# Patient Record
Sex: MALE | Race: BLACK OR AFRICAN AMERICAN | NOT HISPANIC OR LATINO | URBAN - METROPOLITAN AREA
[De-identification: names, ages, dates, MRNs, and addresses within clinical notes are randomized per-mention and may not be internally consistent; named-entity substitution may affect disease eponyms.]

---

## 2017-09-24 ENCOUNTER — EMERGENCY (EMERGENCY)
Facility: HOSPITAL | Age: 47
LOS: 1 days | Discharge: HOME | End: 2017-09-24
Attending: EMERGENCY MEDICINE | Admitting: EMERGENCY MEDICINE
Payer: COMMERCIAL

## 2017-09-24 VITALS
DIASTOLIC BLOOD PRESSURE: 91 MMHG | HEIGHT: 66 IN | TEMPERATURE: 99 F | WEIGHT: 179.9 LBS | OXYGEN SATURATION: 96 % | SYSTOLIC BLOOD PRESSURE: 137 MMHG | RESPIRATION RATE: 18 BRPM | HEART RATE: 88 BPM

## 2017-09-24 VITALS
TEMPERATURE: 98 F | HEART RATE: 82 BPM | OXYGEN SATURATION: 97 % | DIASTOLIC BLOOD PRESSURE: 86 MMHG | SYSTOLIC BLOOD PRESSURE: 136 MMHG | RESPIRATION RATE: 17 BRPM

## 2017-09-24 DIAGNOSIS — M54.5 LOW BACK PAIN: ICD-10-CM
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
## 2017-09-24 PROCEDURE — 99283 EMERGENCY DEPT VISIT LOW MDM: CPT | Mod: 25

## 2017-09-24 PROCEDURE — 72100 X-RAY EXAM L-S SPINE 2/3 VWS: CPT

## 2017-09-24 PROCEDURE — 99284 EMERGENCY DEPT VISIT MOD MDM: CPT

## 2017-09-24 PROCEDURE — 96372 THER/PROPH/DIAG INJ SC/IM: CPT

## 2017-09-24 PROCEDURE — 72100 X-RAY EXAM L-S SPINE 2/3 VWS: CPT | Mod: 26

## 2017-09-24 RX ORDER — LIDOCAINE 4 G/100G
1 CREAM TOPICAL
Qty: 30 | Refills: 0 | OUTPATIENT
Start: 2017-09-24 | End: 2017-10-24

## 2017-09-24 RX ORDER — CYCLOBENZAPRINE HYDROCHLORIDE 10 MG/1
1 TABLET, FILM COATED ORAL
Qty: 9 | Refills: 0 | OUTPATIENT
Start: 2017-09-24 | End: 2017-09-27

## 2017-09-24 RX ORDER — IBUPROFEN 200 MG
1 TABLET ORAL
Qty: 20 | Refills: 0 | OUTPATIENT
Start: 2017-09-24 | End: 2017-09-29

## 2017-09-24 RX ORDER — KETOROLAC TROMETHAMINE 30 MG/ML
60 SYRINGE (ML) INJECTION ONCE
Qty: 0 | Refills: 0 | Status: DISCONTINUED | OUTPATIENT
Start: 2017-09-24 | End: 2017-09-24

## 2017-09-24 RX ORDER — CYCLOBENZAPRINE HYDROCHLORIDE 10 MG/1
10 TABLET, FILM COATED ORAL ONCE
Qty: 0 | Refills: 0 | Status: COMPLETED | OUTPATIENT
Start: 2017-09-24 | End: 2017-09-24

## 2017-09-24 RX ORDER — LIDOCAINE 4 G/100G
1 CREAM TOPICAL ONCE
Qty: 0 | Refills: 0 | Status: COMPLETED | OUTPATIENT
Start: 2017-09-24 | End: 2017-09-24

## 2017-09-24 RX ADMIN — LIDOCAINE 1 PATCH: 4 CREAM TOPICAL at 15:02

## 2017-09-24 RX ADMIN — CYCLOBENZAPRINE HYDROCHLORIDE 10 MILLIGRAM(S): 10 TABLET, FILM COATED ORAL at 15:02

## 2017-09-24 RX ADMIN — Medication 60 MILLIGRAM(S): at 15:02

## 2017-09-24 RX ADMIN — Medication 60 MILLIGRAM(S): at 15:47

## 2017-09-24 RX ADMIN — Medication 60 MILLIGRAM(S): at 15:25

## 2017-09-24 NOTE — ED PROVIDER NOTE - CHPI ED SYMPTOMS NEG
no neck tenderness/no difficulty bearing weight/no motor function loss/no bowel dysfunction/no constipation/no bladder dysfunction/no numbness

## 2017-09-24 NOTE — ED PROVIDER NOTE - ATTENDING CONTRIBUTION TO CARE
Pt is a 48 yo male who presents to the ED with a cc of left lower back pain.  No significant PMHx.  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  He reports that on Friday morning he awoke in bed on his back.  He went to roll onto his left side and felt a sudden onset of sharp pain.  He states that he has been in pain since and that any small movement increases the pain significantly.  Denies prior injury to his back but reports similar pain several years ago and at that time he was diagnosed with a muscle spasm.  Denies fever, chills, N/V, CP, SOB, abd pain, ext numbness or weakness.  Denies loss of bowel or bladder function.  Denies hematuria, dysuria, frequency, or urgency.  Pt is has not taken anything for the pain.  On exam pt lying on his right side in mild distress due to pain.  NCAT, PERRL, EOMI, heart RRR, lungs CTA, abd soft NT/ND.  No midline C/T/L tenderness, no step offs or deformities.  TTP left paraspinal muscle region lower thoracic diffuse lumbar with increased tone and spasm.  No rash noted.  Sensation intact to bilateral lower ext with +pedal pulses bilaterally.  Agree with above plan of care

## 2017-09-24 NOTE — ED PROVIDER NOTE - OBJECTIVE STATEMENT
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  no abdominal pain.  has been taking otc advil for pain, not helping much.  Cant remember name of PMD , No ortho

## 2017-09-24 NOTE — ED ADULT TRIAGE NOTE - CHIEF COMPLAINT QUOTE
"my lower back on my left side has been hurting since friday."  patient states "it hurts more when I turn."   patient denies fever, chills, abdominal pain, n/v, urinary symptoms.

## 2017-11-16 ENCOUNTER — EMERGENCY (EMERGENCY)
Facility: HOSPITAL | Age: 47
LOS: 1 days | Discharge: HOME | End: 2017-11-16
Attending: EMERGENCY MEDICINE | Admitting: EMERGENCY MEDICINE
Payer: COMMERCIAL

## 2017-11-16 VITALS
OXYGEN SATURATION: 97 % | WEIGHT: 179.9 LBS | DIASTOLIC BLOOD PRESSURE: 84 MMHG | RESPIRATION RATE: 16 BRPM | HEART RATE: 71 BPM | SYSTOLIC BLOOD PRESSURE: 121 MMHG | TEMPERATURE: 98 F
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
## 2017-11-16 DIAGNOSIS — S05.02XA INJURY OF CONJUNCTIVA AND CORNEAL ABRASION WITHOUT FOREIGN BODY, LEFT EYE, INITIAL ENCOUNTER: ICD-10-CM

## 2017-11-16 DIAGNOSIS — H57.12 OCULAR PAIN, LEFT EYE: ICD-10-CM

## 2017-11-16 PROCEDURE — 99282 EMERGENCY DEPT VISIT SF MDM: CPT

## 2017-11-16 PROCEDURE — 99284 EMERGENCY DEPT VISIT MOD MDM: CPT

## 2017-11-16 RX ORDER — TOBRAMYCIN AND DEXAMETHASONE 1; 3 MG/ML; MG/ML
2 SUSPENSION/ DROPS OPHTHALMIC
Qty: 1 | Refills: 0
Start: 2017-11-16 | End: 2017-11-21

## 2017-11-16 NOTE — ED PROVIDER NOTE - OBJECTIVE STATEMENT
48 yo black male with 1-day of slight burning pain and redness to left eye without any identifiable trauma. No fever or chills. No diplopia. No headache.  Does not wear contact lenses

## 2017-11-16 NOTE — ED ADULT NURSE NOTE - OBJECTIVE STATEMENT
received pt in FT pt states he feels like something in his left eye since yesterday received pt in FT pt states he feels like something in his left eye since yesterday Pt seen & d/c'd by Dr Hartley

## 2017-11-16 NOTE — ED PROVIDER NOTE - EYES, MLM
Left eye with injected sclera. Lid everted with no FB. Woods lamp after Fluorescein revealed vertical abrasion to left of cornea

## 2018-06-05 ENCOUNTER — EMERGENCY (EMERGENCY)
Facility: HOSPITAL | Age: 48
LOS: 1 days | Discharge: HOME | End: 2018-06-05
Attending: EMERGENCY MEDICINE
Payer: COMMERCIAL

## 2018-06-05 VITALS
TEMPERATURE: 98 F | HEART RATE: 76 BPM | SYSTOLIC BLOOD PRESSURE: 134 MMHG | RESPIRATION RATE: 16 BRPM | DIASTOLIC BLOOD PRESSURE: 90 MMHG | OXYGEN SATURATION: 98 % | HEIGHT: 66 IN | WEIGHT: 179.9 LBS

## 2018-06-05 DIAGNOSIS — Z98.890 OTHER SPECIFIED POSTPROCEDURAL STATES: Chronic | ICD-10-CM

## 2018-06-05 PROCEDURE — 99284 EMERGENCY DEPT VISIT MOD MDM: CPT

## 2018-06-05 PROCEDURE — 99283 EMERGENCY DEPT VISIT LOW MDM: CPT

## 2018-06-05 RX ORDER — TOBRAMYCIN 0.3 %
1 DROPS OPHTHALMIC (EYE)
Qty: 1 | Refills: 0
Start: 2018-06-05 | End: 2018-06-11

## 2018-06-05 NOTE — ED PROVIDER NOTE - OBJECTIVE STATEMENT
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 utd,  no opthalmologisty, PMD cannot remember name.

## 2018-06-05 NOTE — ED PROVIDER NOTE - PROGRESS NOTE DETAILS
left eye + corneal abrasion, rx tobramycin sent to pharmacy,  advised follow up with opthalmologist, given information for Dr Velasco, recommended over the counter tylenol or motrin as directed for pain, any concerns, condition worsens return to ed

## 2018-06-05 NOTE — ED ADULT NURSE NOTE - OBJECTIVE STATEMENT
pt was at work ripping down a wall, looked up and got pain in his left eye. pt rinsed out eye but pain still continued.

## 2018-06-05 NOTE — ED PROVIDER NOTE - CHPI ED SYMPTOMS NEG
no itching/no eye lid swelling/no discharge/no blurred vision/no double vision/no drainage/no photophobia/no purulent drainage

## 2019-01-11 NOTE — ED PROVIDER NOTE - NEURO NEGATIVE STATEMENT, MLM
no loss of consciousness, no gait abnormality, no headache, no sensory deficits, and no weakness. 21.6

## 2019-10-27 ENCOUNTER — EMERGENCY (EMERGENCY)
Facility: HOSPITAL | Age: 49
LOS: 1 days | Discharge: HOME | End: 2019-10-27
Attending: EMERGENCY MEDICINE | Admitting: EMERGENCY MEDICINE
Payer: COMMERCIAL

## 2019-10-27 VITALS
HEART RATE: 80 BPM | WEIGHT: 179.9 LBS | RESPIRATION RATE: 15 BRPM | DIASTOLIC BLOOD PRESSURE: 95 MMHG | OXYGEN SATURATION: 98 % | TEMPERATURE: 98 F | SYSTOLIC BLOOD PRESSURE: 146 MMHG | HEIGHT: 66 IN

## 2019-10-27 DIAGNOSIS — Z98.890 OTHER SPECIFIED POSTPROCEDURAL STATES: Chronic | ICD-10-CM

## 2019-10-27 PROCEDURE — 99283 EMERGENCY DEPT VISIT LOW MDM: CPT

## 2019-10-27 PROCEDURE — 99283 EMERGENCY DEPT VISIT LOW MDM: CPT | Mod: 25

## 2019-10-27 PROCEDURE — 72040 X-RAY EXAM NECK SPINE 2-3 VW: CPT

## 2019-10-27 PROCEDURE — 72040 X-RAY EXAM NECK SPINE 2-3 VW: CPT | Mod: 26

## 2019-10-27 RX ORDER — LIDOCAINE 4 G/100G
1 CREAM TOPICAL ONCE
Refills: 0 | Status: COMPLETED | OUTPATIENT
Start: 2019-10-27 | End: 2019-10-27

## 2019-10-27 RX ADMIN — LIDOCAINE 1 PATCH: 4 CREAM TOPICAL at 22:06

## 2019-10-27 RX ADMIN — Medication 500 MILLIGRAM(S): at 22:06

## 2019-10-27 NOTE — ED PROVIDER NOTE - OBJECTIVE STATEMENT
49 y male presents with left neck pain x 5 days, states he "slept wrong", then pain began 49 y male presents with left neck pain x 5 days, states he "slept wrong", then pain began  , denies fever, blunt trauma,  states he went to urgent care yesterday, was started on robaxin, states it is not helping the pain much, is taking otc tylenol for pain.  denies hx of problems with his neck.  otherwise feels well.  No ortho, No pmd

## 2019-10-27 NOTE — ED PROVIDER NOTE - PATIENT PORTAL LINK FT
You can access the FollowMyHealth Patient Portal offered by [facility] by registering at the following website: [email or web address]. By joining [facility]’s FollowMyHealth portal, you will also be able to view your health information using other applications (apps) compatible with our system.

## 2019-10-27 NOTE — ED PROVIDER NOTE - ATTENDING CONTRIBUTION TO CARE
Pt seen and examined and d/w PA.  agree with a and p.  pt is a 48 yo male no sign hx. pt iwht 5 days of left neck/trap pain after sleeping funny.  pt denies radiation, numbness or weakness in arms.  on exam, left trapezius spasm. pt went to  and given robaxin but no relief so came here.  on rest of exam, c spine nt, sm intact upper extremities, 2+ pulses.  xray neg unremarkable, add lidocaine patch, nsiads, heat, fu ortho

## 2019-10-27 NOTE — ED ADULT NURSE NOTE - CHIEF COMPLAINT QUOTE
neck pain and stiffness x 4 days. patient reports worsening pain today. was seen at urgent care yesterday and prescribed robaxin with no relief

## 2019-10-27 NOTE — ED ADULT NURSE NOTE - OBJECTIVE STATEMENT
Present to ER with c/o of neck pain and stiffness x 4 days. patient reports worsening pain today. was seen at urgent care yesterday and prescribed robaxin with no relief. Denies any chest pain or shortness of breath.

## 2019-10-27 NOTE — ED PROVIDER NOTE - PROGRESS NOTE DETAILS
xray spasm, no acute bony irregularity, rx naproxen sent to pharmacy,  given information for ortho Dr Simons, and a pmd Dr Capellan,  call tomorrow to arrange follow up, otc lidocaine patch as directed for pain

## 2019-10-28 ENCOUNTER — APPOINTMENT (OUTPATIENT)
Dept: INTERNAL MEDICINE | Facility: CLINIC | Age: 49
End: 2019-10-28
Payer: COMMERCIAL

## 2019-10-28 VITALS
DIASTOLIC BLOOD PRESSURE: 74 MMHG | WEIGHT: 176 LBS | TEMPERATURE: 98.3 F | HEART RATE: 72 BPM | SYSTOLIC BLOOD PRESSURE: 116 MMHG | OXYGEN SATURATION: 97 % | RESPIRATION RATE: 14 BRPM | HEIGHT: 69 IN | BODY MASS INDEX: 26.07 KG/M2

## 2019-10-28 DIAGNOSIS — M62.838 OTHER MUSCLE SPASM: ICD-10-CM

## 2019-10-28 PROBLEM — Z00.00 ENCOUNTER FOR PREVENTIVE HEALTH EXAMINATION: Status: ACTIVE | Noted: 2019-10-28

## 2019-10-28 PROCEDURE — 99203 OFFICE O/P NEW LOW 30 MIN: CPT

## 2019-10-28 RX ORDER — METHYLPREDNISOLONE 4 MG/1
4 TABLET ORAL
Qty: 1 | Refills: 0 | Status: ACTIVE | COMMUNITY
Start: 2019-10-28 | End: 1900-01-01

## 2019-10-28 NOTE — PLAN
[FreeTextEntry1] : Discontinue naproxen\par Start medrol dosepack\par continue muscle relaxer\par RTO 1 week

## 2019-10-28 NOTE — HISTORY OF PRESENT ILLNESS
[FreeTextEntry8] : Pt c.o neck discomfort and tight deltoid muscles for 3 days. Pt went to an Urgent Care 2 days ago and received a muscle relaxer. He went to the ER yesterday and received a prescription for Naproxem . He isn't feeling any better. P t reports having negative x-rays in the ER

## 2019-10-28 NOTE — PHYSICAL EXAM
[No Acute Distress] : no acute distress [Well Nourished] : well nourished [Well Developed] : well developed [Well-Appearing] : well-appearing [Normal Sclera/Conjunctiva] : normal sclera/conjunctiva [PERRL] : pupils equal round and reactive to light [EOMI] : extraocular movements intact [Normal Outer Ear/Nose] : the outer ears and nose were normal in appearance [Normal Oropharynx] : the oropharynx was normal [No JVD] : no jugular venous distention [No Lymphadenopathy] : no lymphadenopathy [Supple] : supple [Thyroid Normal, No Nodules] : the thyroid was normal and there were no nodules present [No Respiratory Distress] : no respiratory distress  [No Accessory Muscle Use] : no accessory muscle use [Clear to Auscultation] : lungs were clear to auscultation bilaterally [Normal Rate] : normal rate  [Regular Rhythm] : with a regular rhythm [Normal S1, S2] : normal S1 and S2 [No Murmur] : no murmur heard [No Carotid Bruits] : no carotid bruits [No Abdominal Bruit] : a ~M bruit was not heard ~T in the abdomen [No Varicosities] : no varicosities [Pedal Pulses Present] : the pedal pulses are present [No Edema] : there was no peripheral edema [No Palpable Aorta] : no palpable aorta [No Extremity Clubbing/Cyanosis] : no extremity clubbing/cyanosis [Soft] : abdomen soft [Non Tender] : non-tender [Non-distended] : non-distended [No Masses] : no abdominal mass palpated [No HSM] : no HSM [Normal Bowel Sounds] : normal bowel sounds [Normal Posterior Cervical Nodes] : no posterior cervical lymphadenopathy [Normal Anterior Cervical Nodes] : no anterior cervical lymphadenopathy [No CVA Tenderness] : no CVA  tenderness [No Spinal Tenderness] : no spinal tenderness [No Joint Swelling] : no joint swelling [Grossly Normal Strength/Tone] : grossly normal strength/tone [No Rash] : no rash [Coordination Grossly Intact] : coordination grossly intact [No Focal Deficits] : no focal deficits [Normal Gait] : normal gait [Deep Tendon Reflexes (DTR)] : deep tendon reflexes were 2+ and symmetric [Normal Affect] : the affect was normal [Normal Insight/Judgement] : insight and judgment were intact [de-identified] : Moderately tight deltoid muscles. Decreased range of motion of neck

## 2019-11-04 ENCOUNTER — APPOINTMENT (OUTPATIENT)
Dept: INTERNAL MEDICINE | Facility: CLINIC | Age: 49
End: 2019-11-04

## 2020-01-10 NOTE — ED ADULT TRIAGE NOTE - CHIEF COMPLAINT QUOTE
neck pain and stiffness x 4 days. patient reports worsening pain today. was seen at urgent care yesterday and prescribed robaxin with no relief Alert and oriented, no focal deficits, no motor or sensory deficits.

## 2020-08-05 NOTE — ED ADULT TRIAGE NOTE - RESPIRATORY RATE (BREATHS/MIN)
18 Anesthesia Volume In Cc: 0.5 Post-Care Instructions: I reviewed with the patient in detail post-care instructions. Patient is to wear sunprotection, and avoid picking at any of the treated lesions. Pt may apply Vaseline to crusted or scabbing areas. Detail Level: Detailed Consent: The patient's consent was obtained including but not limited to risks of crusting, scabbing, blistering, scarring, darker or lighter pigmentary change, recurrence, incomplete removal and infection. Price (Use Numbers Only, No Special Characters Or $): 50

## 2021-05-30 NOTE — ED PROVIDER NOTE - NSCAREINITIATED _GEN_ER
Patient is a new consult being seen for PVD. Claudication type symptoms in the right leg. Patient stated can walk about 1/4 mile before pain occurs in the right calf. Does go away with rest. Left leg angioplasty 12yrs ago on left leg done by Dr. Steven Guadarrama. Former smoker on and off for about 39 years. Quit 4 year ago. Patient did have arterial testing done 6/25/19.         Hemanth Roper)

## 2023-09-05 NOTE — ED PROVIDER NOTE - CHIEF COMPLAINT
[facility] [address]    Past Medical History  Past Medical History:   Diagnosis Date    CVA (cerebral vascular accident) ([facility] [address]     Mercedes Bautista M.S. MONTEZ/SLP  Speech-Language Pathologist  [facility]  #[id number] The patient is a 48y Male complaining of eye pain/injury.

## 2024-08-23 PROBLEM — S22.20XA STERNAL FRACTURE: Status: ACTIVE | Noted: 2024-08-23

## 2024-08-26 ENCOUNTER — APPOINTMENT (OUTPATIENT)
Dept: THORACIC SURGERY | Facility: CLINIC | Age: 54
End: 2024-08-26
Payer: COMMERCIAL

## 2024-08-26 VITALS
HEART RATE: 76 BPM | WEIGHT: 210 LBS | HEIGHT: 66 IN | RESPIRATION RATE: 17 BRPM | DIASTOLIC BLOOD PRESSURE: 88 MMHG | OXYGEN SATURATION: 97 % | BODY MASS INDEX: 33.75 KG/M2 | SYSTOLIC BLOOD PRESSURE: 129 MMHG

## 2024-08-26 DIAGNOSIS — S22.20XA UNSPECIFIED FRACTURE OF STERNUM, INITIAL ENCOUNTER FOR CLOSED FRACTURE: ICD-10-CM

## 2024-08-26 PROCEDURE — 99204 OFFICE O/P NEW MOD 45 MIN: CPT

## 2024-08-26 RX ORDER — OXYCODONE 5 MG/1
5 TABLET ORAL EVERY 6 HOURS
Qty: 20 | Refills: 0 | Status: ACTIVE | COMMUNITY
Start: 2024-08-26 | End: 1900-01-01

## 2024-08-26 RX ORDER — IBUPROFEN 600 MG/1
600 TABLET, FILM COATED ORAL EVERY 6 HOURS
Qty: 28 | Refills: 0 | Status: ACTIVE | COMMUNITY
Start: 2024-08-26 | End: 1900-01-01

## 2024-08-26 NOTE — ASSESSMENT
[FreeTextEntry1] : Mr. EMELY NESS, 54 year old male, never smoker, w/ no PMHx who presented for sternal fracture. On 8/9/2024 he dropped a bar bell on his chest when working out at gym.  CT chest with IV contrast on 08/09/2024: - question a subtle nondisplaced fracture of the left anterior mid sternal body ()3: 97: 103) - no pleural effusion or pneumothorax - mild pancreatic ductal dilatation, which can be further evaluated with nonemergent MRCP.   I have reviewed the patient's medical records and diagnostic images at time of this office consultation and have made the following recommendation: 1. CT chest reviewed and discussed with patient, subtle nondisplaced sternal fracture noted. Recommended to repeat non-contrast CT chest in 4 weeks to re-evaluate. 2. C/o pain, Rx. Ibuprofen and Oxy sent for pain management.   I, KIMI Rodriguez, personally performed the evaluation and management (E/M) services for this new patient. That E/M includes conducting the initial examination, assessing all conditions, and establishing the plan of care.  Today, my ACP, BHAVANA Pope was here to observe my evaluation and management services for this patient to be followed going forward.

## 2024-08-26 NOTE — DATA REVIEWED
DATE OF ADMISSION:  09/21/2017      DATE OF DISCHARGE:  09/26/2017      PRIMARY DIAGNOSIS:  Previous diverticular stricture with creation of Marianna's type sigmoid colostomy.      SECONDARY DIAGNOSES:   1.  Parastomal hernia.   2.  Coronary artery disease.   3.  Hypertension.   4.  Previous TIA.      SURGICAL PROCEDURES:  Closure of a Marianna's colostomy plus repair of parastomal hernia.      COMPLICATIONS:  Nil.      HOSPITAL COURSE:  Mr. Peter Zhao is a 72-year-old male who originally presented in February of this year with a large bowel obstruction.  This turned out to be secondary to a benign stricture of the distal sigmoid colon.  He was operated on emergently.  He had a resection of the area and creation of a colostomy.  He did develop significant confusion following that procedure.  He did end up tolerating the colostomy reasonably well.  He did also develop a parastomal hernia.      He has a history of coronary artery disease; does have a pacemaker in place.  There is also a history of having a TIA in the spring of this year after his previous surgery.  He was thus maintained on Plavix.  There is also history of some borderline diabetes.  There is also a history of agitation.      On this occasion, he was admitted into hospital for elective closure of the colostomy and repair of the parastomal hernia.  This was carried out without difficulty.  He had minimal adhesions at the time of the surgery.  He did have an epidural placed prior to the procedure.  We did avoid narcotics post-procedure because of his history of confusion and fortunately he did quite well.  He originally was in the Intensive Care Unit for 2 days because of the epidural.  He has been out on the floor for the last 3 days.  He occasionally will demonstrate some mild confusion but does reorient quite quickly.      He has remained afebrile.  He was slowly advanced to a regular diet.  Bowels did start functioning yesterday.  He had somewhat of  [FreeTextEntry1] : I have independently reviewed patient's CT chest with IV contrast on 08/09/2024 a formed bowel movement yesterday and has had a bit of diarrhea since then.  He denies any cramps.  On examination, his abdomen is soft and nontender.  His wounds look quite good.      He is being discharged home today.  He will go back on his regular medications including Plavix.  He is going to have a wound VAC placed later this afternoon.  He will end up being followed in wound care.  He is scheduled to come back in on Friday afternoon and I will see him at that time for suture removal.  He has been instructed on no lifting for 6 weeks.  He also can eat normally except for particularly high fiber foods such as peanuts and popcorn.      Overall, he has done quite well with the procedure.         RUDY RAYO MD             D: 2017 09:26   T: 2017 10:02   MT: GENO      Name:     ROGER VINSON   MRN:      [id number]        Account:        [id number]   :      1944           Admit Date:                                       Discharge Date:       Document: [id number]

## 2024-08-26 NOTE — PHYSICAL EXAM
[Fully active, able to carry on all pre-disease performance without restriction] : Status 0 - Fully active, able to carry on all pre-disease performance without restriction [General Appearance - Alert] : alert [General Appearance - In No Acute Distress] : in no acute distress [Sclera] : the sclera and conjunctiva were normal [Outer Ear] : the ears and nose were normal in appearance [Neck Appearance] : the appearance of the neck was normal [Auscultation Breath Sounds / Voice Sounds] : lungs were clear to auscultation bilaterally [Heart Rate And Rhythm] : heart rate was normal and rhythm regular [Examination Of The Chest] : the chest was normal in appearance [Chest Visual Inspection Thoracic Asymmetry] : no chest asymmetry [Diminished Respiratory Excursion] : normal chest expansion [2+] : left 2+ [Bowel Sounds] : normal bowel sounds [Cervical Lymph Nodes Enlarged Posterior Bilaterally] : posterior cervical [Abdomen Soft] : soft [Cervical Lymph Nodes Enlarged Anterior Bilaterally] : anterior cervical [No CVA Tenderness] : no ~M costovertebral angle tenderness [No Spinal Tenderness] : no spinal tenderness [Abnormal Walk] : normal gait [Nail Clubbing] : no clubbing  or cyanosis of the fingernails [Musculoskeletal - Swelling] : no joint swelling seen [Motor Tone] : muscle strength and tone were normal [Skin Color & Pigmentation] : normal skin color and pigmentation [Skin Turgor] : normal skin turgor [] : no rash [Deep Tendon Reflexes (DTR)] : deep tendon reflexes were 2+ and symmetric [Sensation] : the sensory exam was normal to light touch and pinprick [No Focal Deficits] : no focal deficits [Oriented To Time, Place, And Person] : oriented to person, place, and time [Impaired Insight] : insight and judgment were intact [Affect] : the affect was normal

## 2024-08-26 NOTE — PHYSICAL EXAM
[Fully active, able to carry on all pre-disease performance without restriction] : Status 0 - Fully active, able to carry on all pre-disease performance without restriction [General Appearance - Alert] : alert [General Appearance - In No Acute Distress] : in no acute distress [Sclera] : the sclera and conjunctiva were normal [Outer Ear] : the ears and nose were normal in appearance [Neck Appearance] : the appearance of the neck was normal [Heart Rate And Rhythm] : heart rate was normal and rhythm regular [Auscultation Breath Sounds / Voice Sounds] : lungs were clear to auscultation bilaterally [Examination Of The Chest] : the chest was normal in appearance [Chest Visual Inspection Thoracic Asymmetry] : no chest asymmetry [Diminished Respiratory Excursion] : normal chest expansion [2+] : left 2+ [Bowel Sounds] : normal bowel sounds [Abdomen Soft] : soft [Cervical Lymph Nodes Enlarged Posterior Bilaterally] : posterior cervical [Cervical Lymph Nodes Enlarged Anterior Bilaterally] : anterior cervical [No CVA Tenderness] : no ~M costovertebral angle tenderness [No Spinal Tenderness] : no spinal tenderness [Abnormal Walk] : normal gait [Nail Clubbing] : no clubbing  or cyanosis of the fingernails [Musculoskeletal - Swelling] : no joint swelling seen [Motor Tone] : muscle strength and tone were normal [Skin Color & Pigmentation] : normal skin color and pigmentation [Skin Turgor] : normal skin turgor [] : no rash [Deep Tendon Reflexes (DTR)] : deep tendon reflexes were 2+ and symmetric [Sensation] : the sensory exam was normal to light touch and pinprick [No Focal Deficits] : no focal deficits [Oriented To Time, Place, And Person] : oriented to person, place, and time [Impaired Insight] : insight and judgment were intact [Affect] : the affect was normal

## 2024-08-26 NOTE — HISTORY OF PRESENT ILLNESS
[FreeTextEntry1] : Mr. EMELY NESS, 54 year old male, never smoker, w/ no PMHx who presented for sternal fracture. On 8/9/2024 he dropped a bar bell on his chest when working out at gym.  CT chest with IV contrast on 08/09/2024: - question a subtle nondisplaced fracture of the left anterior mid sternal body ()3: 97: 103) - no pleural effusion or pneumothorax - mild pancreatic ductal dilatation, which can be further evaluated with nonemergent MRCP.   Patient is here today for CT surgery consultation, referred by ED. He reports of pain 6-7/10 with certain movements, when sneezing, yawning. He reports not being able to raise his B/L arms and move his head backwards due to pain.

## 2024-08-26 NOTE — PHYSICAL EXAM
[Fully active, able to carry on all pre-disease performance without restriction] : Status 0 - Fully active, able to carry on all pre-disease performance without restriction [General Appearance - Alert] : alert [General Appearance - In No Acute Distress] : in no acute distress [Sclera] : the sclera and conjunctiva were normal [Outer Ear] : the ears and nose were normal in appearance [Neck Appearance] : the appearance of the neck was normal [Auscultation Breath Sounds / Voice Sounds] : lungs were clear to auscultation bilaterally [Heart Rate And Rhythm] : heart rate was normal and rhythm regular [Examination Of The Chest] : the chest was normal in appearance [Chest Visual Inspection Thoracic Asymmetry] : no chest asymmetry [Diminished Respiratory Excursion] : normal chest expansion [2+] : left 2+ [Bowel Sounds] : normal bowel sounds [Abdomen Soft] : soft [Cervical Lymph Nodes Enlarged Posterior Bilaterally] : posterior cervical [Cervical Lymph Nodes Enlarged Anterior Bilaterally] : anterior cervical [No CVA Tenderness] : no ~M costovertebral angle tenderness [No Spinal Tenderness] : no spinal tenderness [Abnormal Walk] : normal gait [Nail Clubbing] : no clubbing  or cyanosis of the fingernails [Musculoskeletal - Swelling] : no joint swelling seen [Motor Tone] : muscle strength and tone were normal [Skin Color & Pigmentation] : normal skin color and pigmentation [Skin Turgor] : normal skin turgor [] : no rash [Deep Tendon Reflexes (DTR)] : deep tendon reflexes were 2+ and symmetric [Sensation] : the sensory exam was normal to light touch and pinprick [No Focal Deficits] : no focal deficits [Oriented To Time, Place, And Person] : oriented to person, place, and time [Impaired Insight] : insight and judgment were intact [Affect] : the affect was normal

## 2024-08-30 NOTE — ED ADULT NURSE NOTE - NS_NURSE_DISC_TEACHING_YN_ED_ALL_ED
Stephon Bunn RN  Outcome: Progressing      Yes

## 2024-09-20 ENCOUNTER — OUTPATIENT (OUTPATIENT)
Dept: OUTPATIENT SERVICES | Facility: HOSPITAL | Age: 54
LOS: 1 days | End: 2024-09-20
Payer: COMMERCIAL

## 2024-09-20 DIAGNOSIS — S22.20XA UNSPECIFIED FRACTURE OF STERNUM, INITIAL ENCOUNTER FOR CLOSED FRACTURE: ICD-10-CM

## 2024-09-20 DIAGNOSIS — Z98.890 OTHER SPECIFIED POSTPROCEDURAL STATES: Chronic | ICD-10-CM

## 2024-09-20 PROCEDURE — 71250 CT THORAX DX C-: CPT

## 2024-09-23 ENCOUNTER — APPOINTMENT (OUTPATIENT)
Dept: CT IMAGING | Facility: IMAGING CENTER | Age: 54
End: 2024-09-23
Payer: COMMERCIAL

## 2024-09-23 PROCEDURE — 71250 CT THORAX DX C-: CPT | Mod: 26

## 2024-09-26 NOTE — ASSESSMENT
[FreeTextEntry1] : Mr. EMELY NESS, 54 year old male, never smoker, w/ no PMHx who presented for sternal fracture. On 8/9/2024 he dropped a bar bell on his chest when working out at gym.  CT chest with IV contrast on 08/09/2024: - question a subtle nondisplaced fracture of the left anterior mid sternal body ()3: 97: 103) - no pleural effusion or pneumothorax - mild pancreatic ductal dilatation, which can be further evaluated with nonemergent MRCP.   I have reviewed the patient's medical records and diagnostic images at time of this office consultation and have made the following recommendation: 1.

## 2024-09-26 NOTE — HISTORY OF PRESENT ILLNESS
[FreeTextEntry1] : Mr. EMELY NESS, 54 year old male, never smoker, w/ no PMHx who presented for sternal fracture. On 8/9/2024 he dropped a bar bell on his chest when working out at gym.  CT chest with IV contrast on 08/09/2024: - question a subtle nondisplaced fracture of the left anterior mid sternal body ()3: 97: 103) - no pleural effusion or pneumothorax - mild pancreatic ductal dilatation, which can be further evaluated with nonemergent MRCP.   Patient was initially consulted on 08/26/2024, subtle nondisplaced sternal fracture noted. Recommended to repeat non-contrast CT chest in 4 weeks to re-evaluate.  CT chest on 09/23/2024:  - 2.9 cm hypodense mass in the right thyroid. - More prominent linear defect in the sternum with some callus formation compatible with a healing fracture. - Mild persistent pancreatic ductal dilatation, which can be further evaluated with nonemergent MRCP.  Patient is here today for a follow up.

## 2024-09-30 ENCOUNTER — APPOINTMENT (OUTPATIENT)
Dept: THORACIC SURGERY | Facility: CLINIC | Age: 54
End: 2024-09-30
Payer: COMMERCIAL

## 2024-10-07 ENCOUNTER — APPOINTMENT (OUTPATIENT)
Dept: THORACIC SURGERY | Facility: CLINIC | Age: 54
End: 2024-10-07
Payer: COMMERCIAL

## 2024-10-07 VITALS
OXYGEN SATURATION: 97 % | DIASTOLIC BLOOD PRESSURE: 95 MMHG | SYSTOLIC BLOOD PRESSURE: 142 MMHG | RESPIRATION RATE: 17 BRPM | HEART RATE: 76 BPM | BODY MASS INDEX: 34.55 KG/M2 | HEIGHT: 66 IN | WEIGHT: 215 LBS

## 2024-10-07 DIAGNOSIS — S22.20XA UNSPECIFIED FRACTURE OF STERNUM, INITIAL ENCOUNTER FOR CLOSED FRACTURE: ICD-10-CM

## 2024-10-07 DIAGNOSIS — K86.89 OTHER SPECIFIED DISEASES OF PANCREAS: ICD-10-CM

## 2024-10-07 PROCEDURE — 99213 OFFICE O/P EST LOW 20 MIN: CPT

## 2024-10-07 PROCEDURE — G2211 COMPLEX E/M VISIT ADD ON: CPT | Mod: NC

## 2024-10-16 ENCOUNTER — APPOINTMENT (OUTPATIENT)
Dept: MRI IMAGING | Facility: IMAGING CENTER | Age: 54
End: 2024-10-16

## 2024-11-10 ENCOUNTER — OUTPATIENT (OUTPATIENT)
Dept: OUTPATIENT SERVICES | Facility: HOSPITAL | Age: 54
LOS: 1 days | End: 2024-11-10
Payer: COMMERCIAL

## 2024-11-10 DIAGNOSIS — Z00.8 ENCOUNTER FOR OTHER GENERAL EXAMINATION: ICD-10-CM

## 2024-11-10 DIAGNOSIS — Z98.890 OTHER SPECIFIED POSTPROCEDURAL STATES: Chronic | ICD-10-CM

## 2024-11-10 PROCEDURE — 74183 MRI ABD W/O CNTR FLWD CNTR: CPT

## 2024-11-10 PROCEDURE — A9585: CPT

## 2024-11-18 ENCOUNTER — APPOINTMENT (OUTPATIENT)
Dept: THORACIC SURGERY | Facility: CLINIC | Age: 54
End: 2024-11-18
Payer: COMMERCIAL

## 2024-11-18 DIAGNOSIS — S22.20XA UNSPECIFIED FRACTURE OF STERNUM, INITIAL ENCOUNTER FOR CLOSED FRACTURE: ICD-10-CM

## 2024-11-18 DIAGNOSIS — E04.1 NONTOXIC SINGLE THYROID NODULE: ICD-10-CM

## 2024-11-18 PROCEDURE — 99442: CPT
